# Patient Record
Sex: MALE | Race: WHITE
[De-identification: names, ages, dates, MRNs, and addresses within clinical notes are randomized per-mention and may not be internally consistent; named-entity substitution may affect disease eponyms.]

---

## 2017-10-31 ENCOUNTER — HOSPITAL ENCOUNTER (EMERGENCY)
Dept: HOSPITAL 74 - FER | Age: 22
Discharge: HOME | End: 2017-10-31
Payer: COMMERCIAL

## 2017-10-31 VITALS — HEART RATE: 79 BPM | DIASTOLIC BLOOD PRESSURE: 80 MMHG | SYSTOLIC BLOOD PRESSURE: 129 MMHG | TEMPERATURE: 97.4 F

## 2017-10-31 VITALS — BODY MASS INDEX: 18.9 KG/M2

## 2017-10-31 DIAGNOSIS — L03.114: Primary | ICD-10-CM

## 2017-10-31 DIAGNOSIS — F84.0: ICD-10-CM

## 2017-10-31 PROCEDURE — 3E0234Z INTRODUCTION OF SERUM, TOXOID AND VACCINE INTO MUSCLE, PERCUTANEOUS APPROACH: ICD-10-PCS

## 2017-10-31 NOTE — PDOC
History of Present Illness





- General


History Source: Patient, Parent(s)


Exam Limitations: No Limitations





- History of Present Illness


Initial Comments: 





10/31/17 17:14


23 y/o M with a PMHx of autism, self-injurious behavior presents to the ED with 

swelling and erythema at the base of the left wrist. Patient bit his left hand 

2 weeks ago, and mother noticed redness and swelling today. Mother states he 

was playing with the cat yesterday so the cat may have scratched the scab that 

formed from the previous bite. Mother put bacitracin on the wound, but reports 

pus in the surrounding area. Denies fever, chills, nausea, vomiting, diarrhea.








<Camille Martino - Last Filed: 10/31/17 17:18>





- General


History Source: Patient





<MoPieter wen - Last Filed: 10/31/17 17:41>





- General


Chief Complaint: Wound Infection


Stated Complaint: LEFT HAND WOUND INFECTION


Time Seen by Provider: 10/31/17 16:43





Past History





<Camille Martino - Last Filed: 10/31/17 17:18>





- Immunization History


Td Vaccination: Yes


TDAP Vaccination: Yes


Immunization Up to Date: Yes





- Suicide/Smoking/Psychosocial Hx


Smoking Status: No


Smoking History: Never smoked


Number of Cigarettes Smoked Daily: 0





<Pieter Maloney - Last Filed: 10/31/17 17:41>





- Past Medical History


Allergies/Adverse Reactions: 


 Allergies











Allergy/AdvReac Type Severity Reaction Status Date / Time


 


No Known Allergies Allergy   Verified 10/31/17 16:40











Home Medications: 


Ambulatory Orders





Lamotrigine [Lamictal] 50 mg PO DAILY 08/31/12 


Cephalexin [Keflex] 500 mg PO TID #20 capsule 10/31/17 


Sulfamethoxazole/Trimethoprim [Bactrim Ds -] 1 tab PO BID #14 tablet 10/31/17 











**Review of Systems





- Review of Systems


Constitutional: No: Chills, Fever


HEENTM: No: Symptoms Reported


Respiratory: No: Cough, Shortness of Breath, Wheezing


Cardiac (ROS): No: Chest Pain, Palpitations, Syncope


ABD/GI: No: Diarrhea, Nausea, Vomiting


: No: Symptoms Reported


Musculoskeletal: No: Symptoms Reported


Integumentary: Yes: Erythema (surrounding the left wrist wound), Other (

swelling around left wrist wound)


All Other Systems: Reviewed and Negative





<Camille Martino - Last Filed: 10/31/17 17:18>





*Physical Exam





- Vital Signs


 Last Vital Signs











Temp Pulse Resp BP Pulse Ox


 


 97.4 F L  79   18   129/80   99 


 


 10/31/17 16:39  10/31/17 16:39  10/31/17 16:39  10/31/17 16:39  10/31/17 16:39














- Physical Exam


General Appearance: Yes: Appropriately Dressed.  No: Apparent Distress


Respiratory/Chest: positive: Lungs Clear, Normal Breath Sounds


Cardiovascular: positive: Regular Rhythm, Regular Rate


Gastrointestinal/Abdominal: positive: Normal Bowel Sounds, Soft.  negative: 

Tenderness


Musculoskeletal: positive: Normal Inspection


Extremity: positive: Normal Capillary Refill, Tender (on surrounding area of 

left wrist wound)


Integumentary: positive: Warm, Erythema (surrounding the wound), Other (open 

wound, with pus on left wrist.)


Neurologic: positive: Alert





<Camille Martino - Last Filed: 10/31/17 17:18>





*DC/Admit/Observation/Transfer





- Attestations


Scribe Attestion: 





10/31/17 17:18





Documentation prepared by Camille Martino, acting as medical scribe for Pieter Maloney MD.





<Camille Martino - Last Filed: 10/31/17 17:18>





- Discharge Dispostion


Admit: No





<Pieter Maloney - Last Filed: 10/31/17 17:41>


Diagnosis at time of Disposition: 


 Cellulitis of hand





- Discharge Dispostion


Disposition: HOME


Condition at time of disposition: Stable





- Patient Instructions


Printed Discharge Instructions:  DI for Wound Infection


Additional Instructions: 


CLEAN AND DRY


FOLLOW UP WITH pmd OR


RETURN  HERE FOR WOUND CHECK IN 2-3 DAYS

## 2021-05-10 ENCOUNTER — HOSPITAL ENCOUNTER (EMERGENCY)
Dept: HOSPITAL 74 - FER | Age: 26
Discharge: HOME | End: 2021-05-10
Payer: COMMERCIAL

## 2021-05-10 VITALS — BODY MASS INDEX: 26.4 KG/M2

## 2021-05-10 VITALS — SYSTOLIC BLOOD PRESSURE: 138 MMHG | DIASTOLIC BLOOD PRESSURE: 85 MMHG | HEART RATE: 98 BPM | TEMPERATURE: 97.8 F

## 2021-05-10 DIAGNOSIS — S01.81XA: ICD-10-CM

## 2021-05-10 DIAGNOSIS — L03.115: Primary | ICD-10-CM

## 2021-12-08 ENCOUNTER — HOSPITAL ENCOUNTER (EMERGENCY)
Dept: HOSPITAL 74 - FER | Age: 26
Discharge: HOME | End: 2021-12-08
Payer: COMMERCIAL

## 2021-12-08 VITALS — SYSTOLIC BLOOD PRESSURE: 136 MMHG | TEMPERATURE: 97.9 F | HEART RATE: 88 BPM | DIASTOLIC BLOOD PRESSURE: 77 MMHG

## 2021-12-08 VITALS — BODY MASS INDEX: 26.4 KG/M2

## 2021-12-08 DIAGNOSIS — S01.81XA: Primary | ICD-10-CM
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
## 2021-12-08 PROCEDURE — 0HQ1XZZ REPAIR FACE SKIN, EXTERNAL APPROACH: ICD-10-PCS

## 2022-06-16 ENCOUNTER — HOSPITAL ENCOUNTER (EMERGENCY)
Dept: HOSPITAL 74 - FER | Age: 27
Discharge: HOME | End: 2022-06-16
Payer: COMMERCIAL

## 2022-06-16 VITALS — TEMPERATURE: 98 F | HEART RATE: 77 BPM | SYSTOLIC BLOOD PRESSURE: 123 MMHG | DIASTOLIC BLOOD PRESSURE: 63 MMHG

## 2022-06-16 VITALS — BODY MASS INDEX: 23.1 KG/M2
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
## 2022-06-16 DIAGNOSIS — S01.01XA: Primary | ICD-10-CM

## 2022-06-16 PROCEDURE — 0HQ0XZZ REPAIR SCALP SKIN, EXTERNAL APPROACH: ICD-10-PCS | Performed by: EMERGENCY MEDICINE

## 2022-06-23 ENCOUNTER — HOSPITAL ENCOUNTER (EMERGENCY)
Dept: HOSPITAL 74 - FER | Age: 27
Discharge: HOME | End: 2022-06-23
Payer: COMMERCIAL

## 2022-06-23 VITALS — TEMPERATURE: 97.7 F | SYSTOLIC BLOOD PRESSURE: 130 MMHG | DIASTOLIC BLOOD PRESSURE: 87 MMHG | HEART RATE: 99 BPM

## 2022-06-23 VITALS — BODY MASS INDEX: 27.1 KG/M2

## 2022-06-23 DIAGNOSIS — Z48.02: Primary | ICD-10-CM

## 2022-07-01 ENCOUNTER — HOSPITAL ENCOUNTER (EMERGENCY)
Dept: HOSPITAL 74 - FER | Age: 27
Discharge: HOME | End: 2022-07-01
Payer: COMMERCIAL

## 2022-07-01 VITALS — SYSTOLIC BLOOD PRESSURE: 129 MMHG | DIASTOLIC BLOOD PRESSURE: 83 MMHG | TEMPERATURE: 98 F | HEART RATE: 77 BPM

## 2022-07-01 VITALS — BODY MASS INDEX: 32.5 KG/M2

## 2022-07-01 DIAGNOSIS — S01.01XA: Primary | ICD-10-CM

## 2022-07-11 ENCOUNTER — HOSPITAL ENCOUNTER (EMERGENCY)
Dept: HOSPITAL 74 - FER | Age: 27
Discharge: HOME | End: 2022-07-11
Payer: COMMERCIAL

## 2022-07-11 VITALS — DIASTOLIC BLOOD PRESSURE: 70 MMHG | HEART RATE: 72 BPM | SYSTOLIC BLOOD PRESSURE: 120 MMHG | TEMPERATURE: 98 F

## 2022-07-11 VITALS — BODY MASS INDEX: 27.1 KG/M2

## 2022-07-11 DIAGNOSIS — Z48.02: Primary | ICD-10-CM

## 2022-10-24 ENCOUNTER — HOSPITAL ENCOUNTER (EMERGENCY)
Dept: HOSPITAL 74 - FER | Age: 27
Discharge: HOME | End: 2022-10-24
Payer: COMMERCIAL

## 2022-10-24 VITALS
SYSTOLIC BLOOD PRESSURE: 129 MMHG | HEART RATE: 87 BPM | RESPIRATION RATE: 20 BRPM | DIASTOLIC BLOOD PRESSURE: 84 MMHG | TEMPERATURE: 97.7 F

## 2022-10-24 VITALS — BODY MASS INDEX: 22.4 KG/M2

## 2022-10-24 DIAGNOSIS — F84.0: ICD-10-CM
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
## 2022-10-24 DIAGNOSIS — S01.01XA: Primary | ICD-10-CM

## 2022-10-24 PROCEDURE — 0HQ0XZZ REPAIR SCALP SKIN, EXTERNAL APPROACH: ICD-10-PCS | Performed by: STUDENT IN AN ORGANIZED HEALTH CARE EDUCATION/TRAINING PROGRAM

## 2022-11-01 ENCOUNTER — HOSPITAL ENCOUNTER (EMERGENCY)
Dept: HOSPITAL 74 - FER | Age: 27
Discharge: HOME | End: 2022-11-01
Payer: COMMERCIAL

## 2022-11-01 VITALS
HEART RATE: 79 BPM | RESPIRATION RATE: 20 BRPM | TEMPERATURE: 98 F | SYSTOLIC BLOOD PRESSURE: 122 MMHG | DIASTOLIC BLOOD PRESSURE: 69 MMHG

## 2022-11-01 VITALS — BODY MASS INDEX: 27.1 KG/M2

## 2022-11-01 DIAGNOSIS — Z48.02: Primary | ICD-10-CM

## 2023-02-17 ENCOUNTER — HOSPITAL ENCOUNTER (EMERGENCY)
Dept: HOSPITAL 74 - FER | Age: 28
Discharge: HOME | End: 2023-02-17
Payer: COMMERCIAL

## 2023-02-17 VITALS — BODY MASS INDEX: 26.9 KG/M2

## 2023-02-17 VITALS — RESPIRATION RATE: 18 BRPM | HEART RATE: 80 BPM | DIASTOLIC BLOOD PRESSURE: 87 MMHG | SYSTOLIC BLOOD PRESSURE: 133 MMHG
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
## 2023-02-17 DIAGNOSIS — S01.01XA: Primary | ICD-10-CM

## 2023-02-17 PROCEDURE — 0HQ0XZZ REPAIR SCALP SKIN, EXTERNAL APPROACH: ICD-10-PCS

## 2023-02-24 ENCOUNTER — HOSPITAL ENCOUNTER (EMERGENCY)
Dept: HOSPITAL 74 - FER | Age: 28
Discharge: HOME | End: 2023-02-24
Payer: COMMERCIAL

## 2023-02-24 VITALS
DIASTOLIC BLOOD PRESSURE: 71 MMHG | TEMPERATURE: 98 F | HEART RATE: 89 BPM | SYSTOLIC BLOOD PRESSURE: 131 MMHG | RESPIRATION RATE: 20 BRPM

## 2023-02-24 VITALS — BODY MASS INDEX: 27.1 KG/M2

## 2023-02-24 DIAGNOSIS — Z48.02: Primary | ICD-10-CM

## 2023-03-26 ENCOUNTER — HOSPITAL ENCOUNTER (EMERGENCY)
Dept: HOSPITAL 74 - FER | Age: 28
Discharge: HOME | End: 2023-03-26
Payer: COMMERCIAL

## 2023-03-26 VITALS
SYSTOLIC BLOOD PRESSURE: 124 MMHG | HEART RATE: 89 BPM | TEMPERATURE: 98 F | DIASTOLIC BLOOD PRESSURE: 67 MMHG | RESPIRATION RATE: 18 BRPM

## 2023-03-26 VITALS — BODY MASS INDEX: 27.1 KG/M2
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
## 2023-03-26 DIAGNOSIS — S01.01XA: Primary | ICD-10-CM

## 2023-03-26 PROCEDURE — 0HQ0XZZ REPAIR SCALP SKIN, EXTERNAL APPROACH: ICD-10-PCS

## 2023-07-15 ENCOUNTER — HOSPITAL ENCOUNTER (EMERGENCY)
Dept: HOSPITAL 74 - FER | Age: 28
Discharge: HOME | End: 2023-07-15
Payer: COMMERCIAL

## 2023-07-15 VITALS
SYSTOLIC BLOOD PRESSURE: 128 MMHG | RESPIRATION RATE: 18 BRPM | HEART RATE: 82 BPM | TEMPERATURE: 98.2 F | DIASTOLIC BLOOD PRESSURE: 87 MMHG

## 2023-07-15 VITALS — BODY MASS INDEX: 27.1 KG/M2
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
## 2023-07-15 DIAGNOSIS — S01.01XA: Primary | ICD-10-CM
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
